# Patient Record
Sex: FEMALE | Race: AMERICAN INDIAN OR ALASKA NATIVE | Employment: OTHER | URBAN - METROPOLITAN AREA
[De-identification: names, ages, dates, MRNs, and addresses within clinical notes are randomized per-mention and may not be internally consistent; named-entity substitution may affect disease eponyms.]

---

## 2021-03-15 DIAGNOSIS — Z23 NEED FOR VACCINATION: ICD-10-CM

## 2021-10-19 ENCOUNTER — ORDER TRANSCRIPTION (OUTPATIENT)
Dept: PHYSICAL THERAPY | Facility: HOSPITAL | Age: 76
End: 2021-10-19

## 2021-10-19 DIAGNOSIS — M25.562 PAIN IN LEFT KNEE: Primary | ICD-10-CM

## 2021-11-08 ENCOUNTER — OFFICE VISIT (OUTPATIENT)
Dept: PHYSICAL THERAPY | Age: 76
End: 2021-11-08
Attending: ORTHOPAEDIC SURGERY
Payer: MEDICARE

## 2021-11-08 DIAGNOSIS — M25.562 PAIN IN LEFT KNEE: ICD-10-CM

## 2021-11-08 PROCEDURE — 97110 THERAPEUTIC EXERCISES: CPT

## 2021-11-08 PROCEDURE — 97161 PT EVAL LOW COMPLEX 20 MIN: CPT

## 2021-11-08 NOTE — PROGRESS NOTES
KNEE EVALUATION:   Referring Physician: Dr. Chelita Pemberton  Diagnosis: L knee pain   Date of Service: 11/8/2021     PATIENT SUMMARY   Alan Barnett is a 78year old female who presents to therapy today with complaints of L knee pain since the end of Sept. Sh POC and HEP. Pt voiced understanding and performs HEP correctly without reported pain. Skilled Physical Therapy is medically necessary to address the above impairments and reach functional goals.      Precautions:  None  OBJECTIVE:   Observation/Skin: L kn improve knee AROM flexion to >10 degrees to improve ability to perform transfers up and down from the floor easily  · Pt will improve quad strength to at least 4+/5 to ascend 1 flight of stairs reciprocally and smoothly  · Pt will improve SLS to >  12 s to

## 2021-11-12 ENCOUNTER — OFFICE VISIT (OUTPATIENT)
Dept: PHYSICAL THERAPY | Age: 76
End: 2021-11-12
Attending: ORTHOPAEDIC SURGERY
Payer: MEDICARE

## 2021-11-12 DIAGNOSIS — M25.562 PAIN IN LEFT KNEE: ICD-10-CM

## 2021-11-12 PROCEDURE — 97140 MANUAL THERAPY 1/> REGIONS: CPT

## 2021-11-12 PROCEDURE — 97110 THERAPEUTIC EXERCISES: CPT

## 2021-11-12 PROCEDURE — 97112 NEUROMUSCULAR REEDUCATION: CPT

## 2021-11-12 NOTE — PROGRESS NOTES
Dx:  L Knee OA and pain       Insurance (Authorized # of Visits):  6      Authorizing Physician: Dr. Dahiana Valverde  Next MD visit: none scheduled  Fall Risk: standard         Precautions:            Subjective: Has trouble with R foot muscles shaking when she trie

## 2021-11-15 ENCOUNTER — OFFICE VISIT (OUTPATIENT)
Dept: PHYSICAL THERAPY | Age: 76
End: 2021-11-15
Attending: ORTHOPAEDIC SURGERY
Payer: MEDICARE

## 2021-11-15 DIAGNOSIS — M25.562 PAIN IN LEFT KNEE: ICD-10-CM

## 2021-11-15 PROCEDURE — 97112 NEUROMUSCULAR REEDUCATION: CPT

## 2021-11-15 PROCEDURE — 97110 THERAPEUTIC EXERCISES: CPT

## 2021-11-15 PROCEDURE — 97140 MANUAL THERAPY 1/> REGIONS: CPT

## 2021-11-15 NOTE — PROGRESS NOTES
Dx:  L Knee OA and pain       Insurance (Authorized # of Visits):  6      Authorizing Physician: Dr. Lissa Mendes  Next MD visit: none scheduled  Fall Risk: standard         Precautions:            Subjective: Has trouble with R foot muscles shaking when she trie Patellar mobs, STM to ITB, Gr III tib-femoral mobs , R/L Patellar mobs, STM to ITB, Gr III tib-femoral mobs , R/L      Vestibular board: balance, rock A/P   - SLS R/L 15 sec x3 Vestibular board: balance, rock A/P      HEP: HEP for:  Quad sets, heel slide

## 2021-11-22 ENCOUNTER — OFFICE VISIT (OUTPATIENT)
Dept: PHYSICAL THERAPY | Age: 76
End: 2021-11-22
Attending: ORTHOPAEDIC SURGERY
Payer: MEDICARE

## 2021-11-22 DIAGNOSIS — M25.562 PAIN IN LEFT KNEE: ICD-10-CM

## 2021-11-22 PROCEDURE — 97116 GAIT TRAINING THERAPY: CPT

## 2021-11-22 PROCEDURE — 97110 THERAPEUTIC EXERCISES: CPT

## 2021-11-22 NOTE — PROGRESS NOTES
Dx:  L Knee OA and pain       Insurance (Authorized # of Visits):  6      Authorizing Physician: Dr. Sergei Cardoso  Next MD visit: none scheduled  Fall Risk: standard         Precautions:            Subjective:Wants to be able to walk better with improved weight b TKE  x10   - SLR x  QS x10  Tke x30   QS x15 R/L  SLR x20  SLS : 3 attempts R/L  SLS with hip abd, flex x10 each R/L with RTB      Reviewed all home exercise ASU 6\" step x15 R/L ASU 6\" step up and over x12 R/L       Shuttle: DLP L4  x20  SLP L4  R/L x1

## 2021-11-22 NOTE — PROGRESS NOTES
Dx:  L Knee OA and pain       Insurance (Authorized # of Visits):  6      Authorizing Physician: Dr. Bahena Shown  Next MD visit: none scheduled  Fall Risk: standard         Precautions:            Subjective:Wants to be able to walk better with improved weight b in PBs x10 R/L  Then heel toe gait on carpet     TKE  x10   - SLR x  QS x10  Tke x30   QS x15 R/L  SLR x20  SLS : 3 attempts R/L  SLS with hip abd, flex x10 each R/L with RTB      Reviewed all home exercise ASU 6\" step x15 R/L ASU 6\" step up and over x12

## 2021-11-23 ENCOUNTER — TELEPHONE (OUTPATIENT)
Dept: PHYSICAL THERAPY | Age: 76
End: 2021-11-23

## 2021-11-23 ENCOUNTER — TELEPHONE (OUTPATIENT)
Dept: PHYSICAL THERAPY | Facility: HOSPITAL | Age: 76
End: 2021-11-23

## 2021-11-29 ENCOUNTER — APPOINTMENT (OUTPATIENT)
Dept: PHYSICAL THERAPY | Age: 76
End: 2021-11-29
Attending: ORTHOPAEDIC SURGERY
Payer: MEDICARE

## 2021-11-30 ENCOUNTER — OFFICE VISIT (OUTPATIENT)
Dept: PHYSICAL THERAPY | Age: 76
End: 2021-11-30
Attending: INTERNAL MEDICINE
Payer: MEDICARE

## 2021-11-30 PROCEDURE — 97110 THERAPEUTIC EXERCISES: CPT

## 2021-11-30 PROCEDURE — 97140 MANUAL THERAPY 1/> REGIONS: CPT

## 2021-11-30 NOTE — PROGRESS NOTES
Dx:  L Knee OA and pain       Insurance (Authorized # of Visits):  8      Authorizing Physician: Dr. Cabello ref.  provider found  Next MD visit: none scheduled  Fall Risk: standard         Precautions:            Subjective:Wants to be able to walk better with R/L  Then heel toe gait on carpet practiced heel/toe gait in place in PBs x10 R/L    TKE  x10   - SLR x  QS x10  Tke x30   QS x15 R/L  SLR x20  SLS : 3 attempts R/L  SLS with hip abd, flex x10 each R/L with RTB  SLS : 3 attempts R/L  SLS with hip abd, flex

## 2021-12-03 ENCOUNTER — OFFICE VISIT (OUTPATIENT)
Dept: PHYSICAL THERAPY | Age: 76
End: 2021-12-03
Attending: ORTHOPAEDIC SURGERY
Payer: MEDICARE

## 2021-12-03 DIAGNOSIS — M25.562 PAIN IN LEFT KNEE: ICD-10-CM

## 2021-12-03 PROCEDURE — 97110 THERAPEUTIC EXERCISES: CPT

## 2021-12-03 PROCEDURE — 97140 MANUAL THERAPY 1/> REGIONS: CPT

## 2021-12-03 NOTE — PROGRESS NOTES
Dx:  L Knee OA and pain       Insurance (Authorized # of Visits):  6      Authorizing Physician: Dr. Melisa Ocononr  Next MD visit: none scheduled  Fall Risk: standard         Precautions:            Subjective:Wants to be able to walk better with improved weight b gait on carpet practiced heel/toe gait in place in PBs x10 R/L Manual gastroc stretches, supine posterior tibial mobs into extension, seated tib-femoral mobs Gr II   TKE  x10   - SLR x  QS x10  Tke x30   QS x15 R/L  SLR x20  SLS : 3 attempts R/L  SLS with

## 2021-12-06 ENCOUNTER — OFFICE VISIT (OUTPATIENT)
Dept: PHYSICAL THERAPY | Age: 76
End: 2021-12-06
Attending: ORTHOPAEDIC SURGERY
Payer: MEDICARE

## 2021-12-06 DIAGNOSIS — M25.562 PAIN IN LEFT KNEE: ICD-10-CM

## 2021-12-06 PROCEDURE — 97140 MANUAL THERAPY 1/> REGIONS: CPT

## 2021-12-06 PROCEDURE — 97110 THERAPEUTIC EXERCISES: CPT

## 2021-12-06 NOTE — PROGRESS NOTES
Dx:  L Knee OA and pain       Insurance (Authorized # of Visits):  6      Authorizing Physician: Dr. Rani Tidwell  Next MD visit: none scheduled  Fall Risk: standard         Precautions:            Subjective:. Felt some snapping at L lateral leg today.  Notes she warm up    Encouraged walking, nustep, reviewing posture for all activites.    Advised against holding weights during walking program.   practiced heel/toe gait in place in PBs x10 R/L  Then heel toe gait on carpet practiced heel/toe gait in place in PBs x1 min

## 2021-12-13 ENCOUNTER — OFFICE VISIT (OUTPATIENT)
Dept: PHYSICAL THERAPY | Age: 76
End: 2021-12-13
Attending: ORTHOPAEDIC SURGERY
Payer: MEDICARE

## 2021-12-13 DIAGNOSIS — M25.562 PAIN IN LEFT KNEE: ICD-10-CM

## 2021-12-13 PROCEDURE — 97110 THERAPEUTIC EXERCISES: CPT

## 2021-12-13 PROCEDURE — 97140 MANUAL THERAPY 1/> REGIONS: CPT

## 2021-12-13 NOTE — PROGRESS NOTES
Dx:  L Knee OA and pain       Insurance (Authorized # of Visits):  6      Authorizing Physician: Dr. Tamera Riedel  Next MD visit: none scheduled  Fall Risk: standard         Precautions:          Discharge Summary   Subjective:. . Notes she has no trouble standing TX#: 5/8 Date: 12/3/2021  Tx# 6/8 12/6/2021 7/8 12/13/2021 8/8   Nu-step 5 mins warm up Level 4  Nu-step 5 mins warm up Level 4  Nu-step 5 mins warm up Level 4  Nu-step 5 mins warm up Level 4  Nu- step 5 mins warm up  7 mins Nu-step    Encouraged Gr III tib-femoral mobs  Medial knee glides L  (some tenderness at L lateral side of patella) Hip add  with ball  3 sec x20    Vestibular board: balance, rock A/P TKE 2# x30 R/L (slight pain on R) TKE 2# x30 R/L (no pain on R/L) Walked 1/10 of mile on trac

## 2022-01-27 ENCOUNTER — APPOINTMENT (OUTPATIENT)
Dept: PHYSICAL THERAPY | Age: 77
End: 2022-01-27
Attending: ORTHOPAEDIC SURGERY
Payer: MEDICARE

## 2022-02-03 ENCOUNTER — APPOINTMENT (OUTPATIENT)
Dept: PHYSICAL THERAPY | Age: 77
End: 2022-02-03
Attending: ORTHOPAEDIC SURGERY
Payer: MEDICARE

## 2022-02-10 ENCOUNTER — APPOINTMENT (OUTPATIENT)
Dept: PHYSICAL THERAPY | Age: 77
End: 2022-02-10
Attending: ORTHOPAEDIC SURGERY
Payer: MEDICARE

## 2022-02-10 ENCOUNTER — TELEPHONE (OUTPATIENT)
Dept: PHYSICAL THERAPY | Age: 77
End: 2022-02-10

## 2022-02-17 ENCOUNTER — ORDER TRANSCRIPTION (OUTPATIENT)
Dept: PHYSICAL THERAPY | Facility: HOSPITAL | Age: 77
End: 2022-02-17

## 2022-02-17 ENCOUNTER — OFFICE VISIT (OUTPATIENT)
Dept: PHYSICAL THERAPY | Age: 77
End: 2022-02-17
Attending: ORTHOPAEDIC SURGERY
Payer: MEDICARE

## 2022-02-17 PROCEDURE — 97140 MANUAL THERAPY 1/> REGIONS: CPT

## 2022-02-17 PROCEDURE — 97110 THERAPEUTIC EXERCISES: CPT

## 2022-02-17 PROCEDURE — 97162 PT EVAL MOD COMPLEX 30 MIN: CPT

## 2022-02-17 NOTE — PROGRESS NOTES
SPINE EVALUATION:   Referring Physician: Dr. Kelby Peguero  Diagnosis: Cervical pain   , Knee OA and gait disturbance. Date of Service: 2/17/2022     PATIENT SUMMARY   Ashlee Smith is a 78year old female who presents to therapy today with complaints of neck pain when she is walking. Doesn't have it everyday. Uses icy/heat on the back of her neck for relief. Rufina Horton a history of neck pain pain for several year that would come and go with flare up in the past 6 months. . Notes she has been under a lot of stress lately and knows that it is related. No recent imaging. Stopped working out with a  as she is having trouble with overall mobility. She is afraid of walking, falling and afraid of driving. Was able to drive once with her son in law. Lives alone. She denies knee pain but knows she has L knee OA. Patient had been working out with a  but has temporarily stopped due to knee pain ,gait difficulties and need for therapy. Pt describes pain level current 2 /10, at best  0/10, at worst 6-7 /10. Current functional limitations include pain looking up or down, getting up after being in lying down position. Perla describes prior level of function denies pain a year ago. Pt goals include decrease neck pain, improve walking. Past medical history was reviewed with Perla. Significant findings include  Anxiety , cortisone shot L knee . Patient completed a series of PT for knee pain and gait disturbance in Nov, Dec 2021. Pt denies diplopia, dysarthria, dysphasia, dizziness, drop attacks, bowel/bladder changes, saddle anesthesia, and DEVORA LE N/T.    ASSESSMENT  Perla presents to physical therapy evaluation with primary c/o neck pain. .Secondary complaint is gait disturbance with fear of falling and history of bilateral knee arthritic pain. Anxiety appears to be playing a role in her gait and fear of falling.  The results of the objective tests and measures show restriction in CT joint mobility, postural deviations, decreased knee extension and L hip extension bilaterally and L knee valgus. Functional deficits include but are not limited to looking up and down., walking on levels and stairs. .  Signs and symptoms are consistent with diagnosis of cervical pain without radiculopathy, likely DDD. Pt and PT discussed evaluation findings, pathology, POC and HEP. Pt voiced understanding and performs HEP correctly without reported pain. Skilled Physical Therapy is medically necessary to address the above impairments and reach functional goals. Precautions:  None  OBJECTIVE:   Observation/Posture: fwd head . R shoulder and hip higher than L.   L genu valgus. Neuro Screen: Intact sensation and reflexes at UEs     Cervical  AROM: (* denotes performed with pain)  Flexion: WNL, slight pain  Extension: + pain , 25% wnl  Sidebending: R =wnl; L =wnl w/ pain l. Rotation: R 55 deg ; L=60 deg   Pain L     Bilateral knee extension lacks 10 degrees from neutral. L hip lacks 5 deg extension    Accessory motion: impaired at CT junction  Palpation: tenderness at C7-T1, T2 spinous processes , upper traps     Strength: UE=wnl  LE:   Hips=4+/5, knees=4/5 ankles=4/5  Flexibility:   UE/Scapular   Upper Trap: R/L min restricted  Levator Scap: R/L=min restricted  Pec Major: R /L=min restricted     LE flexibility     Hamstrings: unable to fully extend R or L leg. Gastroc-soleus: R/L= mod restricted  Piriformis: R =wnl; L mod restricted with joint limitations  Quads: R = min restricted ; L mod restricted  * Decreased L hip ext (mod decreased psoas mobility     Special tests:   - Alar ligament testing,  - Spurling,   - AA and AO testing. Gait: pt ambulates on level ground without device, stiff legged gait, decreased weight shift L., decreased heel strike R/L, hips and knees held in flexion. Holds onto rail in hallway  Balance: SLS R/L=13 secs   Sit to stand: able to complete without UE support.   [employer] Treatment and Response:   Pt education was provided on exam findings, treatment diagnosis, treatment plan, expectations, and prognosis. Pt was also provided recommendations for use of heat, frequent position changes. Encouraged ongoing counseling, mental health support during this difficult period of her life. Patient was instructed in and issued a HEP for: continued balance exercises  ,  Adding C AROM rot R/L  x5 each . Reviewed HEP and practiced SLS , partial squats, heel raises    M; initiated manual CTX, supine PROM R/L rotation, STM to upper traps and levators,  CPA to CT spine, grade II CT junction mobs. Charges: PT Eval Moderate Complexity, M ex     Total Timed Treatment:  15* min     Total Treatment Time: 45 min     Based on clinical rationale and outcome measures, this evaluation involved Moderate Complexity decision making due to 3+ personal factors/comorbidities, 3 body structures involved/activity limitations, and evolving symptoms including changing pain levels. PLAN OF CARE:    Goals: (to be met in 8 visits)   *Improve cervical AROM to allow patient to look up and down w/out pain  * Improve C rot to allow patient to rotate 60 deg R/L w/out pain to allow her to look behind her without difficulty  * Improve confidence in gait to allow patient to complete community ambulation without holding onto wall or rail   * Improve bilateral knee extension to within 5 deg of full extension to allow improved heel strike in gait. * Improve SLS to 20 secs R/L to improve stability in gait. * Patient is IND to return to exercises program at gym with her . Frequency / Duration: Patient will be seen for 2 x/week or a total of 8 visits over a 90 day period.  Treatment will include: Manual Therapy, Neuromuscular Re-education, Self-Care Home Management, Therapeutic Exercise and Home Exercise Program instruction    Education or treatment limitation: None  Rehab Potential:good        Patient/Family/Caregiver was advised of these findings, precautions, and treatment options and has agreed to actively participate in planning and for this course of care. Thank you for your referral. Please co-sign or sign and return this letter via fax as soon as possible to [phone]. If you have any questions, please contact me at Dept: [phone]    Sincerely,  Electronically signed by therapist: Marco Leos, PT    [id number] certification required: Yes  I certify the need for these services furnished under this plan of treatment and while under my care.     X___________________________________________________ Date____________________    Certification From: 0/65/9175  To:5/18/2022

## 2022-02-24 ENCOUNTER — OFFICE VISIT (OUTPATIENT)
Dept: PHYSICAL THERAPY | Age: 77
End: 2022-02-24
Attending: ORTHOPAEDIC SURGERY
Payer: MEDICARE

## 2022-02-24 PROCEDURE — 97140 MANUAL THERAPY 1/> REGIONS: CPT

## 2022-02-24 PROCEDURE — 97116 GAIT TRAINING THERAPY: CPT

## 2022-02-24 PROCEDURE — 97110 THERAPEUTIC EXERCISES: CPT

## 2022-02-24 NOTE — PROGRESS NOTES
Dx:  Bilateral knee OA, cervical strain. Insurance (Authorized # of Visits):  8         Authorizing Physician: Dr. Ana Brennan ref. provider found  Next MD visit: none scheduled  Fall Risk: standard         Precautions: n/a             Subjective: Neck has been much better since last appt. Feels comfort from wearing a scarf. Pain: 0*/10      Objective:     Gait: walked to clinic with L knee flexion, holding knee stiff-legged, small step length, holding on to [place] rail for UE support. No L knee pain with patellar femoral mobs or end range KE. Assessment: Neck was asymptomatic today. Pt continues to amb with short stride, decreased L knee flexion and expresses fear of falling. Continues to have high anxiety overall. Goals: (to be met in 8 visits)   *Improve cervical AROM to allow patient to look up and down w/out pain  * Improve C rot to allow patient to rotate 60 deg R/L w/out pain to allow her to look behind her without difficulty  * Improve confidence in gait to allow patient to complete community ambulation without holding onto wall or rail   * Improve bilateral knee extension to within 5 deg of full extension to allow improved heel strike in gait. * Improve SLS to 20 secs R/L to improve stability in gait. * Patient is IND to return to exercises program at gym with her . Plan: Cont w/ gait and balance training, L knee rehab.  cervical rehab prn. Date: 2/24/2022  TX#: 2/6 Date:                 TX#: 3/ Date:                 TX#: 4/ Date:                 TX#: 5/ Date: Tx#: 6/   Nu- step  St 10  5 mins w/u       Standing L knee flexion ROM on step  R/L x15 ea       PBs: side step R/L x12   - walk in PBs 12' x4  Step fwd/back with L LE in weight bearing       Shuttle : DLP L4 x20  DHR L3 x15        TKE x20        Manual C TX , AAROM C rot R/L : 5 min total       Practiced gait without UE support with cues for stride length , L knee flexion at swing through and step length. Practiced gait in PBs and side step R/L 10' x4         Tib-femoral mobs Gr II-III ,  PF mobs in supine and on shuttle, Manual OP in to full knee ext  8 mins        HEP:  Quad sets, heel slides, gastroc stretches, prone knee bends x15 R/L      Charges:m, ex, gt       Total Timed Treatment: 45 min  Total Treatment Time: 45 min

## 2022-03-03 ENCOUNTER — OFFICE VISIT (OUTPATIENT)
Dept: PHYSICAL THERAPY | Age: 77
End: 2022-03-03
Attending: ORTHOPAEDIC SURGERY
Payer: MEDICARE

## 2022-03-03 PROCEDURE — 97110 THERAPEUTIC EXERCISES: CPT

## 2022-03-03 PROCEDURE — 97140 MANUAL THERAPY 1/> REGIONS: CPT

## 2022-03-03 PROCEDURE — 97112 NEUROMUSCULAR REEDUCATION: CPT

## 2022-03-03 NOTE — PROGRESS NOTES
Dx:  Bilateral knee OA, cervical strain. Insurance (Authorized # of Visits):  8         Authorizing Physician: Dr. Blanca Alberto ref. provider found  Next MD visit: none scheduled  Fall Risk: standard         Precautions: n/a             Subjective: Neck has been much better since last appt. Neck feels good. Doesn't comfortable walking outside. Was able to drive once this week. L kneecap feels like it catches at times. Pain: 0*/10      Objective:     Gait: walked to clinic with L knee flexion, holding knee stiff-legged, small step length, holding on to [place] rail for UE support. No L knee pain with patellar femoral mobs or end range KE. Assessment:. Pt continues to amb with short stride, decreased L knee flexion and expresses fear of falling but demonstrates good balance on the vestibular board and is able to respond to cues for KNF at swing through. Goals: (to be met in 8 visits)   *Improve cervical AROM to allow patient to look up and down w/out pain MET   * Improve C rot to allow patient to rotate 60 deg R/L w/out pain to allow her to look behind her without difficulty    * Improve confidence in gait to allow patient to complete community ambulation without holding onto wall or rail   * Improve bilateral knee extension to within 5 deg of full extension to allow improved heel strike in gait. * Improve SLS to 20 secs R/L to improve stability in gait. * Patient is IND to return to exercises program at gym with her . Plan: Cont w/ gait and balance training, L knee rehab.  cervical rehab prn.  3/8, 3/17 3/24. Check SLS   Date: 2/24/2022  TX#: 2/6 Date:  3/3/2022               TX#: 3/6 Date:                 TX#: 4/ Date:                 TX#: 5/ Date:    Tx#: 6/   Nu- step  St 10  5 mins w/u  Nu-step: 5 mins       Standing L knee flexion ROM on step  R/L x15 ea Balance board : A/P and balance      PBs: side step R/L x12   - walk in PBs 12' x4  Step fwd/back with L LE in weight bearing Standing dynamic stretches R/L on 8\" step   ( hamstrings, hip flexors, hip IR/ER)       Shuttle : DLP L4 x20  DHR L3 x15  Shuttle : DLP L5 x20  SLP R/L  X X20   DHR L3 x15 , alt calf stretches       TKE x20  TKE x10 , then x25 with 1# wt       Manual C TX , AAROM C rot R/L : 5 min total Manual medial L patellar glide, stretch to lateral retinaculum. Practiced gait without UE support with cues for stride length , L knee flexion at swing through and step length.    Practiced gait in PBs and side step R/L 10' x4 3 way kicks with YTB x10 R/L with cues for posture  - lateral walks with band R/L         Tib-femoral mobs Gr II-III ,  PF mobs in supine and on shuttle, Manual OP in to full knee ext  8 mins  Tib-femoral mobs Gr II-III ,  PF mobs in supine and on shuttle,  - PF mobs with AROM R/L KNF x10      HEP:  Quad sets, heel slides, gastroc stretches, prone knee bends x15 R/L  3/3/2022:  ASU x30 , SLS R/L       Charges:m, ex, neuroreed      Total Timed Treatment: 45 min  Total Treatment Time: 45 min

## 2022-03-08 ENCOUNTER — OFFICE VISIT (OUTPATIENT)
Dept: PHYSICAL THERAPY | Age: 77
End: 2022-03-08
Attending: ORTHOPAEDIC SURGERY
Payer: MEDICARE

## 2022-03-08 PROCEDURE — 97140 MANUAL THERAPY 1/> REGIONS: CPT

## 2022-03-08 PROCEDURE — 97110 THERAPEUTIC EXERCISES: CPT

## 2022-03-08 PROCEDURE — 97112 NEUROMUSCULAR REEDUCATION: CPT

## 2022-03-08 NOTE — PROGRESS NOTES
Dx:  Bilateral knee OA, cervical strain. Insurance (Authorized # of Visits):  8         Authorizing Physician: Dr. Terrance Burkitt ref. provider found  Next MD visit: none scheduled  Fall Risk: standard         Precautions: n/a             Subjective:  Knees feel stiff today. . Doesn't comfortable walking outside. . L kneecap feels like it catches at times. Pain: 0*/10      Objective:     Gait: walked to clinic with L knee flexion, holding knee stiff-legged, small step length, holding on to [place] rail for UE support. No L knee pain with patellar femoral mobs or end range KE. Assessment:. Good tolerance of walking 1/10 of mile. Pt continues to amb with short stride, decreased L knee flexion and expresses fear of falling but demonstrates good balance on the vestibular board and is able to respond to cues for KNF at swing through. Goals: (to be met in 8 visits)   *Improve cervical AROM to allow patient to look up and down w/out pain MET   * Improve C rot to allow patient to rotate 60 deg R/L w/out pain to allow her to look behind her without difficulty    * Improve confidence in gait to allow patient to complete community ambulation without holding onto wall or rail   * Improve bilateral knee extension to within 5 deg of full extension to allow improved heel strike in gait. * Improve SLS to 20 secs R/L to improve stability in gait. * Patient is IND to return to exercises program at gym with her . Plan: Cont w/ gait and balance training, L knee rehab.  cervical rehab prn.   3/17 3/24. Check SLS . Progress to walking outside. Date: 2/24/2022  TX#: 2/6 Date:  3/3/2022               TX#: 3/6 Date:    3/8/2022             TX#: 4/6 Date:                 TX#: 5/ Date:    Tx#: 6/   Nu- step  St 10  5 mins w/u  Nu-step: 5 mins   Nu-step 8 ins      Standing L knee flexion ROM on step  R/L x15 ea Balance board : A/P and balance Balance board : A/P and balance     PBs: side step R/L x12   - walk in PBs 12' x4  Step fwd/back with L LE in weight bearing Standing dynamic stretches R/L on 8\" step   ( hamstrings, hip flexors, hip IR/ER)  Tap ups R/L on 6\" step x10 each, step ups 6\" x10 each      Shuttle : DLP L4 x20  DHR L3 x15  Shuttle : DLP L5 x20  SLP R/L  X X20   DHR L3 x15 , alt calf stretches  Shuttle : DLP L5 x20  SLP R/L  X X20   DHR L3 x15 , alt calf stretches      TKE x20  TKE x10 , then x25 with 1# wt  TKE L x20 with medial patellar glide   - tib femoral mobs Gr III R/L; medial patellar glides and stretch to lateral retinaculum      Manual C TX , AAROM C rot R/L : 5 min total Manual medial L patellar glide, stretch to lateral retinaculum. Walk 1/10 of mile with cues for KNF at  swing through, needed 1 rest      Practiced gait without UE support with cues for stride length , L knee flexion at swing through and step length.    Practiced gait in PBs and side step R/L 10' x4 3 way kicks with YTB x10 R/L with cues for posture  - lateral walks with band R/L         Tib-femoral mobs Gr II-III ,  PF mobs in supine and on shuttle, Manual OP in to full knee ext  8 mins  Tib-femoral mobs Gr II-III ,  PF mobs in supine and on shuttle,  - PF mobs with AROM R/L KNF x10      HEP:  Quad sets, heel slides, gastroc stretches, prone knee bends x15 R/L  3/3/2022:  ASU x30 , SLS R/L       Charges:m, ex, neuroreed      Total Timed Treatment: 40 min  Total Treatment Time: 40 min

## 2022-03-17 ENCOUNTER — OFFICE VISIT (OUTPATIENT)
Dept: PHYSICAL THERAPY | Age: 77
End: 2022-03-17
Attending: ORTHOPAEDIC SURGERY
Payer: MEDICARE

## 2022-03-17 PROCEDURE — 97110 THERAPEUTIC EXERCISES: CPT

## 2022-03-17 PROCEDURE — 97112 NEUROMUSCULAR REEDUCATION: CPT

## 2022-03-17 NOTE — PROGRESS NOTES
Dx:  Bilateral knee OA, cervical strain. Insurance (Authorized # of Visits):  8         Authorizing Physician: Dr. Zan Marcos ref. provider found  Next MD visit: none scheduled  Fall Risk: standard         Precautions: n/a             Subjective:  Knees feel stiff today. . Did walk outside yesterday. . L knee was sore after last session but feels better today. Pain: 0*/10      Objective:     Gait: walked to clinic with L knee flexion, holding knee stiff-legged, small step length, decreased stride length and hip extension    No L knee pain with patellar femoral mobs or end range KE. Assessment:. Good tolerance of walking 1/10 of mile. Patient is still afraid of walking on floors that are too smooth. Continued to encourage walking and outings. Encouraged patient to schedule restart of personal training in April. Pt continues to amb with short stride, decreased L knee flexion and expresses fear of falling but demonstrates good balance on the vestibular board and is able to respond to cues for KNF at swing through. Goals: (to be met in 8 visits)   *Improve cervical AROM to allow patient to look up and down w/out pain MET   * Improve C rot to allow patient to rotate 60 deg R/L w/out pain to allow her to look behind her without difficulty    * Improve confidence in gait to allow patient to complete community ambulation without holding onto wall or rail   * Improve bilateral knee extension to within 5 deg of full extension to allow improved heel strike in gait. * Improve SLS to 20 secs R/L to improve stability in gait. * Patient is IND to return to exercises program at gym with her . Plan: Cont w/ gait and balance training, L knee rehab.  cervical rehab prn.    3/24. Check SLS . Progress to walking outside. Prepare for discharge next visit.     Date: 2/24/2022  TX#: 2/6 Date:  3/3/2022               TX#: 3/6 Date:    3/8/2022             TX#: 4/6 Date: 3/17/2022                TX#: 5/6 Date:   Tx#: 6/   Nu- step  St 10  5 mins w/u  Nu-step: 5 mins   Nu-step 8 ins  Nu- step 5 mins     Standing L knee flexion ROM on step  R/L x15 ea Balance board : A/P and balance Balance board : A/P and balance Balance board : A/P, M/L and balance    PBs: side step R/L x12   - walk in PBs 12' x4  Step fwd/back with L LE in weight bearing Standing dynamic stretches R/L on 8\" step   ( hamstrings, hip flexors, hip IR/ER)  Tap ups R/L on 6\" step x10 each, step ups 6\" x10 each  Standing hip abd x10 R/L  - hip circles x8 R/L  - standing hip flexor stretches 30 sec x3 R/L  - heel raises x15    Shuttle : DLP L4 x20  DHR L3 x15  Shuttle : DLP L5 x20  SLP R/L  X X20   DHR L3 x15 , alt calf stretches  Shuttle : DLP L5 x20  SLP R/L  X X20   DHR L3 x15 , alt calf stretches  DLP L 4 x20   SLP L4 x20 , L5 x10   L3 DHR  X12, alt heel stretches R/L     TKE x20  TKE x10 , then x25 with 1# wt  TKE L x20 with medial patellar glide   - tib femoral mobs Gr III R/L; medial patellar glides and stretch to lateral retinaculum  Recumbent bike : 5 mins L 2    Manual C TX , AAROM C rot R/L : 5 min total Manual medial L patellar glide, stretch to lateral retinaculum. Walk 1/10 of mile with cues for KNF at  swing through, needed 1 rest  Walk 1/10 of mile with cues for KNF at  swing through no rest.     Practiced gait without UE support with cues for stride length , L knee flexion at swing through and step length. Practiced gait in PBs and side step R/L 10' x4 3 way kicks with YTB x10 R/L with cues for posture  - lateral walks with band R/L   Seated hip add x20       Tib-femoral mobs Gr II-III ,  PF mobs in supine and on shuttle, Manual OP in to full knee ext  8 mins  Tib-femoral mobs Gr II-III ,  PF mobs in supine and on shuttle,  - PF mobs with AROM R/L KNF x10  Assessment of patient's gym shoes for safety.      HEP:  Quad sets, heel slides, gastroc stretches, prone knee bends x15 R/L  3/3/2022:  ASU x30 , SLS R/L       Charges ex, neuroreed 2    Total Timed Treatment: 45 min  Total Treatment Time: 45 min

## 2022-03-24 ENCOUNTER — TELEPHONE (OUTPATIENT)
Dept: PHYSICAL THERAPY | Facility: HOSPITAL | Age: 77
End: 2022-03-24

## 2022-03-24 ENCOUNTER — APPOINTMENT (OUTPATIENT)
Dept: PHYSICAL THERAPY | Age: 77
End: 2022-03-24
Attending: ORTHOPAEDIC SURGERY
Payer: MEDICARE

## 2022-03-28 ENCOUNTER — OFFICE VISIT (OUTPATIENT)
Dept: PHYSICAL THERAPY | Age: 77
End: 2022-03-28
Attending: INTERNAL MEDICINE
Payer: MEDICARE

## 2022-03-28 PROCEDURE — 97116 GAIT TRAINING THERAPY: CPT

## 2022-03-28 PROCEDURE — 97110 THERAPEUTIC EXERCISES: CPT

## 2022-03-28 PROCEDURE — 97140 MANUAL THERAPY 1/> REGIONS: CPT

## 2022-03-28 NOTE — PROGRESS NOTES
Dx:  Bilateral knee OA, cervical strain. Insurance (Authorized # of Visits):  8         Authorizing Physician: Dr. Teresa Benton ref. provider found  Next MD visit: none scheduled  Fall Risk: standard         Precautions: n/a           Discharge Summary   Subjective:  Feels she is walking better since starting therapy. Tries not to hold onto walls or furniture when she walks. Feels ready to return to personal training. Pain: 0*/10  Just L knee stiffness       Objective:     Gait: walked to clinic with decreased trunk rotation, decreased  L knee flexion, stiff-legged, small step length, decreased stride length and hip extension    No L knee pain with patellar femoral mobs or end range KE. Assessment:. Good tolerance of walking 1/10 of mile. Patient is still afraid of walking on floors that are too smooth. Continued to encourage walking and outings. Encouraged patient to schedule restart of personal training in April. Pt continues to amb with short stride, decreased L knee flexion and expresses fear of falling but demonstrates good balance on the vestibular board and is able to respond to cues for KNF at swing through. Goals: (to be met in 8 visits)   *Improve cervical AROM to allow patient to look up and down w/out pain MET   * Improve C rot to allow patient to rotate 60 deg R/L w/out pain to allow her to look behind her without difficulty   MET   * Improve confidence in gait to allow patient to complete community ambulation without holding onto wall or rail   Improved   * Improve bilateral knee extension to within 5 deg of full extension to allow improved heel strike in gait. * Improve SLS to 20 secs R/L to improve stability in gait. * Patient is IND to return to exercises program at gym with her . MET         Plan:       Check SLS .    Date: 2/24/2022  TX#: 2/6 Date:  3/3/2022               TX#: 3/6 Date:    3/8/2022             TX#: 4/6 Date: 3/17/2022                TX#: 5/6 Date: 3/28/2022  Tx#: 6/6   Nu- step  St 10  5 mins w/u  Nu-step: 5 mins   Nu-step 8 ins  Nu- step 5 mins  Nu- step 5 mins    Standing L knee flexion ROM on step  R/L x15 ea Balance board : A/P and balance Balance board : A/P and balance Balance board : A/P, M/L and balance Balance board : A/P, M/L and balance   PBs: side step R/L x12   - walk in PBs 12' x4  Step fwd/back with L LE in weight bearing Standing dynamic stretches R/L on 8\" step   ( hamstrings, hip flexors, hip IR/ER)  Tap ups R/L on 6\" step x10 each, step ups 6\" x10 each  Standing hip abd x10 R/L  - hip circles x8 R/L  - standing hip flexor stretches 30 sec x3 R/L  - heel raises x15 Walk 1/10 of mile, no rests. Practiced gait without UE support with cues for stride length , L knee flexion at swing through and step length. Shuttle : DLP L4 x20  DHR L3 x15  Shuttle : DLP L5 x20  SLP R/L  X X20   DHR L3 x15 , alt calf stretches  Shuttle : DLP L5 x20  SLP R/L  X X20   DHR L3 x15 , alt calf stretches  DLP L 4 x20   SLP L4 x20 , L5 x10   L3 DHR  X12, alt heel stretches R/L  DLP L 4 x20   SLP L4 x20 , L5 x10   L3 DHR  X12, alt heel stretches R/L    TKE x20  TKE x10 , then x25 with 1# wt  TKE L x20 with medial patellar glide   - tib femoral mobs Gr III R/L; medial patellar glides and stretch to lateral retinaculum  Recumbent bike : 5 mins L 2 Seated knee ext x20 L   [id number]   Manual C TX , AAROM C rot R/L : 5 min total Manual medial L patellar glide, stretch to lateral retinaculum. Walk 1/10 of mile with cues for KNF at  swing through, needed 1 rest  Walk 1/10 of mile with cues for KNF at  swing through no rest.  Manual medial L patellar glide, stretch to lateral retinaculum. Practiced gait without UE support with cues for stride length , L knee flexion at swing through and step length.    Practiced gait in PBs and side step R/L 10' x4 3 way kicks with YTB x10 R/L with cues for posture  - lateral walks with band R/L   Seated hip add x20  - tib femoral mobs Gr III R/L; medial patellar glides and stretch to lateral retinaculum     Tib-femoral mobs Gr II-III ,  PF mobs in supine and on shuttle, Manual OP in to full knee ext  8 mins  Tib-femoral mobs Gr II-III ,  PF mobs in supine and on shuttle,  - PF mobs with AROM R/L KNF x10  Assessment of patient's gym shoes for safety.   Side step R/L without UE support, braiding R/L without UE support    HEP:  Quad sets, heel slides, gastroc stretches, prone knee bends x15 R/L  3/3/2022:  ASU x30 , SLS R/L       Charges ex, m, gt   Total Timed Treatment: 45 min  Total Treatment Time: 45 min

## 2023-03-17 ENCOUNTER — ORDER TRANSCRIPTION (OUTPATIENT)
Dept: PHYSICAL THERAPY | Facility: HOSPITAL | Age: 78
End: 2023-03-17

## 2023-03-17 DIAGNOSIS — R26.9 ABNORMALITY OF GAIT AND MOBILITY: Primary | ICD-10-CM

## 2023-04-04 ENCOUNTER — OFFICE VISIT (OUTPATIENT)
Dept: PHYSICAL THERAPY | Age: 78
End: 2023-04-04
Attending: ORTHOPAEDIC SURGERY
Payer: MEDICARE

## 2023-04-04 DIAGNOSIS — R26.9 ABNORMALITY OF GAIT AND MOBILITY: ICD-10-CM

## 2023-04-04 PROCEDURE — 97116 GAIT TRAINING THERAPY: CPT

## 2023-04-04 PROCEDURE — 97110 THERAPEUTIC EXERCISES: CPT

## 2023-04-04 PROCEDURE — 97161 PT EVAL LOW COMPLEX 20 MIN: CPT

## 2023-04-07 ENCOUNTER — OFFICE VISIT (OUTPATIENT)
Dept: PHYSICAL THERAPY | Age: 78
End: 2023-04-07
Attending: ORTHOPAEDIC SURGERY
Payer: MEDICARE

## 2023-04-07 PROCEDURE — 97112 NEUROMUSCULAR REEDUCATION: CPT

## 2023-04-07 PROCEDURE — 97140 MANUAL THERAPY 1/> REGIONS: CPT

## 2023-04-07 PROCEDURE — 97110 THERAPEUTIC EXERCISES: CPT

## 2023-04-07 PROCEDURE — 97116 GAIT TRAINING THERAPY: CPT

## 2023-04-10 ENCOUNTER — OFFICE VISIT (OUTPATIENT)
Dept: PHYSICAL THERAPY | Age: 78
End: 2023-04-10
Attending: ORTHOPAEDIC SURGERY
Payer: MEDICARE

## 2023-04-10 PROCEDURE — 97110 THERAPEUTIC EXERCISES: CPT

## 2023-04-10 PROCEDURE — 97112 NEUROMUSCULAR REEDUCATION: CPT

## 2023-04-17 ENCOUNTER — OFFICE VISIT (OUTPATIENT)
Dept: PHYSICAL THERAPY | Age: 78
End: 2023-04-17
Attending: ORTHOPAEDIC SURGERY
Payer: MEDICARE

## 2023-04-17 PROCEDURE — 97112 NEUROMUSCULAR REEDUCATION: CPT

## 2023-04-17 PROCEDURE — 97110 THERAPEUTIC EXERCISES: CPT

## 2023-04-20 ENCOUNTER — OFFICE VISIT (OUTPATIENT)
Dept: PHYSICAL THERAPY | Age: 78
End: 2023-04-20
Attending: ORTHOPAEDIC SURGERY
Payer: MEDICARE

## 2023-04-20 PROCEDURE — 97116 GAIT TRAINING THERAPY: CPT

## 2023-04-20 PROCEDURE — 97110 THERAPEUTIC EXERCISES: CPT

## 2023-04-24 ENCOUNTER — OFFICE VISIT (OUTPATIENT)
Dept: PHYSICAL THERAPY | Age: 78
End: 2023-04-24
Attending: ORTHOPAEDIC SURGERY
Payer: MEDICARE

## 2023-04-24 PROCEDURE — 97110 THERAPEUTIC EXERCISES: CPT

## 2023-04-24 PROCEDURE — 97140 MANUAL THERAPY 1/> REGIONS: CPT

## 2023-04-27 ENCOUNTER — TELEPHONE (OUTPATIENT)
Dept: PHYSICAL THERAPY | Facility: HOSPITAL | Age: 78
End: 2023-04-27

## 2023-04-27 ENCOUNTER — APPOINTMENT (OUTPATIENT)
Dept: PHYSICAL THERAPY | Age: 78
End: 2023-04-27
Attending: ORTHOPAEDIC SURGERY
Payer: MEDICARE

## 2023-05-01 ENCOUNTER — OFFICE VISIT (OUTPATIENT)
Dept: PHYSICAL THERAPY | Age: 78
End: 2023-05-01
Attending: ORTHOPAEDIC SURGERY
Payer: MEDICARE

## 2023-05-01 PROCEDURE — 97110 THERAPEUTIC EXERCISES: CPT

## 2023-06-09 ENCOUNTER — OFFICE VISIT (OUTPATIENT)
Dept: PHYSICAL THERAPY | Age: 78
End: 2023-06-09
Attending: INTERNAL MEDICINE
Payer: MEDICARE

## 2023-06-09 PROCEDURE — 97110 THERAPEUTIC EXERCISES: CPT

## 2023-06-23 ENCOUNTER — APPOINTMENT (OUTPATIENT)
Dept: PHYSICAL THERAPY | Age: 78
End: 2023-06-23
Attending: ORTHOPAEDIC SURGERY
Payer: MEDICARE

## 2024-02-14 ENCOUNTER — ORDER TRANSCRIPTION (OUTPATIENT)
Dept: PHYSICAL THERAPY | Facility: HOSPITAL | Age: 79
End: 2024-02-14

## 2024-02-14 DIAGNOSIS — M13.862 OTHER SPECIFIED ARTHRITIS, LEFT KNEE: Primary | ICD-10-CM

## 2024-03-21 ENCOUNTER — OFFICE VISIT (OUTPATIENT)
Dept: PHYSICAL THERAPY | Age: 79
End: 2024-03-21
Attending: ORTHOPAEDIC SURGERY
Payer: MEDICARE

## 2024-03-21 DIAGNOSIS — M13.862 OTHER SPECIFIED ARTHRITIS, LEFT KNEE: Primary | ICD-10-CM

## 2024-03-21 PROCEDURE — 97140 MANUAL THERAPY 1/> REGIONS: CPT

## 2024-03-21 PROCEDURE — 97162 PT EVAL MOD COMPLEX 30 MIN: CPT

## 2024-03-21 PROCEDURE — 97110 THERAPEUTIC EXERCISES: CPT

## 2024-03-21 NOTE — PROGRESS NOTES
KNEE EVALUATION:   Referring Physician: Dr. Myles  Diagnosis: L knee pain , gait disturbance  R neck pain  Date of Service: 3/21/2024    Precautions: NONE      PATIENT SUMMARY   Perla Bernard is a 76 year old female who presents to therapy today with complaints  chronic  L knee pain . She has PT for her L knee 2 years ago and PT 6/2023 for gait and balance training.  She has had pa.  Had a L knee meniscus tear 13 years ago when she sustained a fall. .   Worked out consistently after that.  Works out with  Milla at , who noted she has had decreased WB on R knee for a while. . Wears a L knee sleeve. Reports she has had difficulty walking on uneven surfaces.  X rays + for L knee OA. Has had  cortisone shots in the past.  Lives alone in a 2 story home.   Current functional limitations include : problem with walking, problem with standing more than 10 mins . Also notes R sided  neck pain that has been chronic but flared up Saturday after doing weights Friday. f. Doing TRX and BOSU 1-2 days week. With    Not using a device for gait but is fearful of walking outside at times. Using pain relieving cream for top of R toes.    Goals : improve walking outside and on uneven surfaces. , ease neck pain   No falls in past year. Able to get up and down from the floor.   Pain : 0 in L knee or R knee   Neck : 3/1)       Perla describes prior level of function : notes she had an altered gait pattern for several years due to knee problems but is IND with all ADLs.  Past medical history was reviewed with Perla. Significant findings include L knee OA, hypothyroid, neck pain                          ASSESSMENT  Perla presents to physical therapy evaluation with primary c/o acute on chronic  R sided neck pain  as well as chronic bilateral knee pain that comes and goes .  . The results of the objective tests and measures show decreased R knee extension and decreased knee flexion at  swing through bilaterally leading  to  altered gait pattern She is also limited in L hip extension and internal rotation. She has TTP at R levator scapulae and R sided neck pain when turning L or side bending left consistent with muscular strain and faulty osture.  .  Functional deficits include but are not limited to walking, especially on uneven surfaces, standing  as well as turning head to Left.     Pt and PT discussed evaluation findings, pathology, POC and HEP.  Pt voiced understanding and performs HEP correctly without reported pain. Skilled Physical Therapy is medically necessary to address the above impairments and reach functional goals.      Precautions:  None  OBJECTIVE:   Observation/Skin: L knee valgus   Palpation: tenderness lateral to L patella     Sensation: intact     AAROM: (* denotes performed with pain)   Knee    Flexion: R 123 deg; L 127 deg   Extension: R 9 deg ; L =0      R hip ROM= WNL    L= WNL except hip IR=0, L hip ext=5 with LBP  Ankle ROM is wnl     Cervical Spine   Flex: wnl with R sided pain   Ext=75% WNL  Rot R= WNL  Rot L= min restricted with pain R  Lat flexion R= WNL   L= WNL with pain R     Patellar Mobility/Accessory motion: mod impaired L      Flexibility:   Hamstrings: unable to fully extend R or L leg.   Gastroc-soleus: R/L= mod restricted  Piriformis: R =wnl; L mod restricted with joint limitations  Quads: R = min restricted ; L mod restricted  * Decreased L hip ext (mod decreased psoas mobility )      Strength/MMT: (* denotes performed with pain)  Hip Knee   Flexion: R 4+/5; L 4+ 5  Extension: R 4/5; L 4/5  Abduction: R 4+/5; L 4+ /5  ER: R   4+*/5; L 4+/5 Flexion: R 4+/5; L 4/5  Extension: R 4/5; L 4-/5         Balance: SLS: R =5  sec, L 4 sec     Functional Mobility:  30 sec sit to stand: 9 reps                   Special Tests: - Spurling, - Sharp Emile  - SLR     Gait: pt ambulates on level ground with L knee held in flexion, decreased weight on L , trunk shifted L       Today’s Treatment and Response:    Pt education was provided on exam findings, treatment diagnosis, treatment plan, expectations, and prognosis.   Initiated levator and upper trap stretches, pectoral stretches, chin tucks  Initiated manual cervical traction and PROM  to C spine     Charges: PT Eval Low Complexity, ex ,M  Total Timed Treatment: 25min     Total Treatment Time: 45 min         PLAN OF CARE:    Goals: (To be met in 8 visits)   Pt will improve knee extension R ROM to 0 deg to allow proper heel strike during gait and terminal knee extension in stance  Improve LE strength as evidenced by improved 30 sec STS from 9 reps to 11   Achieve pain free cervical rotation L and lateral flexion L through stretching, posture correction and manual therapy   Pt will improve SLS to >  12 s to improve safety and independence with gait on uneven surfaces such as grass  Pt will be independent and compliant with comprehensive HEP to maintain progress achieved in PT     Frequency / Duration: Patient will be seen for 2* x/week or a total of 8 visits over a 90 day period.  Treatment will include: Gait training, Neuromuscular Re-education, Self-Care Home Management, Therapeutic Exercise and Home Exercise Program instruction     Education or treatment limitation: None  Rehab Potential:good        Patient/Family/Caregiver was advised of these findings, precautions, and treatment options and has agreed to actively participate in planning and for this course of care.     Thank you for your referral. Please co-sign or sign and return this letter via fax as soon as possible to [phone]. If you have any questions, please contact me at Dept: [phone]     Sincerely,  Electronically signed by therapist: Tracy Walker, PT  Physician's certification required: Yes  I certify the need for these services furnished under this plan of treatment and while under my care.     X___________________________________________________ Date____________________  Certification    3/21/24-6/20/24

## 2024-03-26 ENCOUNTER — OFFICE VISIT (OUTPATIENT)
Dept: PHYSICAL THERAPY | Age: 79
End: 2024-03-26
Attending: ORTHOPAEDIC SURGERY
Payer: MEDICARE

## 2024-03-26 PROCEDURE — 97112 NEUROMUSCULAR REEDUCATION: CPT

## 2024-03-26 PROCEDURE — 97110 THERAPEUTIC EXERCISES: CPT

## 2024-03-26 PROCEDURE — 97140 MANUAL THERAPY 1/> REGIONS: CPT

## 2024-03-26 NOTE — PROGRESS NOTES
Insurance Primary/Secondary: MEDICARE / [employer]     # Auth Visits:      8     Referring Physician: Dr. Myles  Diagnosis: L knee pain , gait disturbance  R neck pain  Date of Service: 3/21/2024    Precautions: NONE     From Initial Eval: Perla presents to physical therapy evaluation with primary c/o acute on chronic  R sided neck pain  as well as chronic bilateral knee pain that comes and goes .  . The results of the objective tests and measures show decreased R knee extension and decreased knee flexion at  swing through bilaterally leading to  altered gait pattern She is also limited in L hip extension and internal rotation. She has TTP at R levator scapulae and R sided neck pain when turning L or side bending left consistent with muscular strain and faulty posture.    Subjective:  Neck feels a little better.     Pain: 3 /10 R side of neck    No knee pain now, slight R knee pain in am.       Objective:   Cervical rotation L : reproduces R pain  ( min )   0-60 deg L   0-63 deg R    Cervical lateral flex L: reproduces R pain       Assessment:  Min restriction in C rot L ROM.  + tightness at R upper trap.  + TTP R T1-2 lateral facet.   Challenged by braiding exercise. Holds knees stiffly in gait.         Goals: (To be met in 8 visits)   Pt will improve knee extension R ROM to 0 deg to allow proper heel strike during gait and terminal knee extension in stance  Improve LE strength as evidenced by improved 30 sec STS from 9 reps to 11   Achieve pain free cervical rotation L and lateral flexion L through stretching, posture correction and manual therapy   Pt will improve SLS to >  12 s to improve safety and independence with gait on uneven surfaces such as grass  Pt will be independent and compliant with comprehensive HEP to maintain progress achieved in PT        Plan: Th 3/28, 4/2, 4/4   Cont with manual therapy and stretching to ease R neck pain, ROM  and strengthening for knees, gait and balance   Date:  3/26/2024  TX#: 2/5 Date:                 TX#: 3/ Date:                 TX#: 4/ Date:                 TX#: 5/ Date:   Tx#: 6/   Nustep warm up 5 mins       Supine alt shoulder flexion with 2# wts x15 R/L , supine protraction x15 R/L   - supine shoulder abduction x15         C retraction x10  - alt upper trap stretch in supine          Shoulder circles back x10        Side step R/L 10' x4 in Pbs         Manual : 15 mins  Cervical traction 10 sec hold x8  Knee:  manual OP into full extension with towel roll, then without  - tibial posterior mobs  - patellar mobs,   - PROM C rot R/L   - PROM lat flexion R/L       Neuroreed : 10 mins   Balance board A/P, x15   Each knees flexed, knees extended   - Side step R/L in Pbs   - braiding R/L 10' x4 , needing verbal cues and demonstration   - reinforced posture correction, relaxed shoulders              HEP: Initiated levator and upper trap stretches, pectoral stretches, chin tucks  Initiated manual cervical traction and PROM  to C spine    Charges: m,ex , neuroreed       Total Timed Treatment: 45 min  Total Treatment Time: 45 min

## 2024-03-28 ENCOUNTER — OFFICE VISIT (OUTPATIENT)
Dept: PHYSICAL THERAPY | Age: 79
End: 2024-03-28
Attending: ORTHOPAEDIC SURGERY
Payer: MEDICARE

## 2024-03-28 PROCEDURE — 97110 THERAPEUTIC EXERCISES: CPT

## 2024-03-28 PROCEDURE — 97140 MANUAL THERAPY 1/> REGIONS: CPT

## 2024-03-28 NOTE — PROGRESS NOTES
Insurance Primary/Secondary: MEDICARE / [employer]     # Auth Visits:      8     Referring Physician: Dr. Myles  Diagnosis: L knee pain , gait disturbance  R neck pain  Date of Service: 3/21/2024    Precautions: NONE     From Initial Eval: Perla presents to physical therapy evaluation with primary c/o acute on chronic  R sided neck pain  as well as chronic bilateral knee pain that comes and goes .  . The results of the objective tests and measures show decreased R knee extension and decreased knee flexion at  swing through bilaterally leading to  altered gait pattern She is also limited in L hip extension and internal rotation. She has TTP at R levator scapulae and R sided neck pain when turning L or side bending left consistent with muscular strain and faulty posture.    Subjective:  Neck feels  better., not as tight on R side     Pain: 3 /10 R side of neck    No knee pain now, slight R knee pain in am.       Objective:   Cervical rotation L : reproduces R pain  ( min )   0-60 deg L   0-63 deg R    Cervical lateral flex L: reproduces R pain       Assessment:  Min restriction in C rot L ROM.  Min tightness at R upper trap.   Min + TTP R T1-2 lateral facet.   Continued to need cues for braiding exercise. Holds knees stiffly in gait.  Challenged by tandem stance with C rotation        Goals: (To be met in 8 visits)   Pt will improve knee extension R ROM to 0 deg to allow proper heel strike during gait and terminal knee extension in stance  Improve LE strength as evidenced by improved 30 sec STS from 9 reps to 11   Achieve pain free cervical rotation L and lateral flexion L through stretching, posture correction and manual therapy   Pt will improve SLS to >  12 s to improve safety and independence with gait on uneven surfaces such as grass  Pt will be independent and compliant with comprehensive HEP to maintain progress achieved in PT        Plan: , 4/2, 4/4   Cont with manual therapy and stretching to ease R neck  pain, ROM  and strengthening for knees, gait and balance . Assess benefit of tape  Date: 3/26/2024  TX#: 2/5 Date:       3/28/2024           TX#: 3/5 Date:                 TX#: 4/ Date:                 TX#: 5/ Date:   Tx#: 6/   Nustep warm up 5 mins Nustep L6 , 6 min warm up      Supine alt shoulder flexion with 2# wts x15 R/L , supine protraction x15 R/L   - supine shoulder abduction x15  Shuttle 5c leg press  R/L SLP x10 ea  - alt calf stretches R/L x15 each        C retraction x10  - alt upper trap stretch in supine  Braiding R/L : in and out of Pbs    SLS R/L   Tandem stance R/L   - tandem stance with C rot R/L         Shoulder circles back x10  C AROM R/L , upper trap and levator stretches,   Upper cervical rotation and nod.       Side step R/L 10' x4 in Pbs  ---       Manual : 15 mins  Cervical traction 10 sec hold x8  Knee:  manual OP into full extension with towel roll, then without  - tibial posterior mobs  - patellar mobs,   - PROM C rot R/L   - PROM lat flexion R/L Manual : 15 mins  Cervical traction 10 sec hold x8  PROM C rot R/L   - PROM lat flexion R/L  STM to R levator , upper trap   Kinesiotape to inhibit levator on R       Neuroreed : 10 mins   Balance board A/P, x15   Each knees flexed, knees extended   - Side step R/L in Pbs   - braiding R/L 10' x4 , needing verbal cues and demonstration   - reinforced posture correction, relaxed shoulders              HEP: Initiated levator and upper trap stretches, pectoral stretches, chin tucks  3/28/2024   SLS R/L , braiding , tandem step with C rotation     Charges: m,ex2   Total Timed Treatment: 45 min  Total Treatment Time: 45 min

## 2024-04-02 ENCOUNTER — OFFICE VISIT (OUTPATIENT)
Dept: PHYSICAL THERAPY | Age: 79
End: 2024-04-02
Attending: ORTHOPAEDIC SURGERY
Payer: MEDICARE

## 2024-04-02 PROCEDURE — 97110 THERAPEUTIC EXERCISES: CPT

## 2024-04-02 PROCEDURE — 97112 NEUROMUSCULAR REEDUCATION: CPT

## 2024-04-02 PROCEDURE — 97140 MANUAL THERAPY 1/> REGIONS: CPT

## 2024-04-02 NOTE — PROGRESS NOTES
Insurance Primary/Secondary: MEDICARE / [employer]     # Auth Visits:      [id number]     Referring Physician: Dr. Myles  Diagnosis: L knee pain , gait disturbance  R neck pain  Date of Service: 3/21/2024    Precautions: NONE     From Initial Eval: Perla presents to physical therapy evaluation with primary c/o acute on chronic  R sided neck pain  as well as chronic bilateral knee pain that comes and goes .  . The results of the objective tests and measures show decreased R knee extension and decreased knee flexion at  swing through bilaterally leading to  altered gait pattern She is also limited in L hip extension and internal rotation. She has TTP at R levator scapulae and R sided neck pain when turning L or side bending left consistent with muscular strain and faulty posture.    Subjective:  Neck feels  better., not as tight on R side . Feels tape helped. Got new shoes and feels she is walking more stiff legged.     Pain: 0 /10 R side of neck , knees          Objective:     SLS L= 20 sec   R= 12 secs     Cervical rotation    0-60 deg L   0-63 deg R    Cervical lateral flex L: reproduces R pain/ pull      Assessment: Tends to avoid heel- toe sequence in gait and walk flat footed.   Min restriction in C rot L ROM Cont with KT as patient got relief of neck pain . .  Min tightness at R upper trap.   Min + TTP R T1-2 lateral facet.     R knee slightly weaker than L. Continued to need cues for braiding exercise. Holds knees stiffly in gait.  Challenged by tandem stance with C rotation        Goals: (To be met in 8 visits)   Pt will improve knee extension R ROM to 0 deg to allow proper heel strike during gait and terminal knee extension in stance    Improve LE strength as evidenced by improved 30 sec STS from 9 reps to 11  MET   Achieve pain free cervical rotation L and lateral flexion L through stretching, posture correction and manual therapy   IMPROVED   Pt will improve SLS to >  12 s to improve safety and independence with  gait on uneven surfaces such as grass MET   Pt will be independent and compliant with comprehensive HEP to maintain progress achieved in PT         Plan: , 4/4  1:15  Cont with manual therapy and stretching to ease R neck pain, ROM  and strengthening for knees, gait and balance  x1 visit . Recheck 30 sec sit to stand  Date: 3/26/2024  TX#: 2/5 Date:       3/28/2024           TX#: 3/5 Date:       4/2/2024           TX#: 4/5 Date:                 TX#: 5/ Date:   Tx#: 6/   Nustep warm up 5 mins Nustep L6 , 6 min warm up Walk 1/10 of mile  - practiced up and down 3 stairs w/out UE support     Supine alt shoulder flexion with 2# wts x15 R/L , supine protraction x15 R/L   - supine shoulder abduction x15  Shuttle 5c leg press  R/L SLP x10 ea  - alt calf stretches R/L x15 each  Shuttle 5c double leg press  x20   R/L SLP x15 ea  - alt calf stretches R/L x15 each   - Airex: march , SLS,   Toe raises on airex  - walk with high march   - ASU 6\" step x15 R/L      C retraction x10  - alt upper trap stretch in supine  Braiding R/L : in and out of Pbs    SLS R/L   Tandem stance R/L   - tandem stance with C rot R/L  Neuroreed: 15 mins  Braiding R/L : in and out of Pbs    SLS R/L   Tandem stance R/L   - tandem stance with C rot R/L x5 each        Shoulder circles back x10  C AROM R/L , upper trap and levator stretches,   Upper cervical rotation and nod.  Rocking horse : R/L   Practiced heel/toe gait , with shoes and w/out     Side step R/L 10' x4 in Pbs  ---       Manual : 15 mins  Cervical traction 10 sec hold x8  Knee:  manual OP into full extension with towel roll, then without  - tibial posterior mobs  - patellar mobs,   - PROM C rot R/L   - PROM lat flexion R/L Manual : 15 mins  Cervical traction 10 sec hold x8  PROM C rot R/L   - PROM lat flexion R/L  STM to R levator , upper trap   Kinesiotape to inhibit levator on R  Manual : 10 mins  Cervical traction 10 sec hold x8  PROM C rot R/L   - PROM lat flexion R/L  STM to R levator  , upper trap   Kinesiotape to inhibit levator and upper trap on R      Neuroreed : 10 mins   Balance board A/P, x15   Each knees flexed, knees extended   - Side step R/L in Pbs   - braiding R/L 10' x4 , needing verbal cues and demonstration   - reinforced posture correction, relaxed shoulders              HEP: Initiated levator and upper trap stretches, pectoral stretches, chin tucks  3/28/2024   SLS R/L , braiding , tandem step with C rotation     Charges: m,ex, neuroreed    Total Timed Treatment: 45 min  Total Treatment Time: 45 min

## 2024-04-04 ENCOUNTER — OFFICE VISIT (OUTPATIENT)
Dept: PHYSICAL THERAPY | Age: 79
End: 2024-04-04
Attending: ORTHOPAEDIC SURGERY
Payer: MEDICARE

## 2024-04-04 PROCEDURE — 97140 MANUAL THERAPY 1/> REGIONS: CPT

## 2024-04-04 PROCEDURE — 97110 THERAPEUTIC EXERCISES: CPT

## 2024-04-04 PROCEDURE — 97116 GAIT TRAINING THERAPY: CPT

## 2024-04-04 NOTE — PROGRESS NOTES
Insurance Primary/Secondary: MEDICARE / [employer]     # Auth Visits:      8     Referring Physician: Dr. Myles  Diagnosis: L knee pain , gait disturbance  R neck pain  Date of Service: 3/21/2024    Precautions: NONE     From Initial Eval: Perla presents to physical therapy evaluation with primary c/o acute on chronic  R sided neck pain  as well as chronic bilateral knee pain that comes and goes .  . The results of the objective tests and measures show decreased R knee extension and decreased knee flexion at  swing through bilaterally leading to  altered gait pattern She is also limited in L hip extension and internal rotation. She has TTP at R levator scapulae and R sided neck pain when turning L or side bending left consistent with muscular strain and faulty posture.      Discharge Summary     Patient has completed 5  of 5 visits with no cancels, zero no shows.     Subjective:  Notes she woke with a lot of soreness on R part of her neck . Knows that her pain is worse when she is worried about things .  Feels tape helped.      Pain: 3 /10 R side of neck , knees          Objective:     30 sec sit to stand =9 reps   SLS L= 20 sec   R= 12 secs     Cervical rotation    0-60 deg L   0-63 deg R    Cervical lateral flex L: reproduces R pain/ pull      Assessment:  Patient is discharged from PT today as she in IND with her home program and will continue to exercise with her . Tends to avoid heel- toe sequence in gait and walk flat footed which seems to be more for security and out of habit that from physical impairment. .   Min restriction in C rot L ROM Cont with KT as patient got relief of neck pain . .  Min tightness at R upper trap.   Min + TTP R T1-2 lateral facet.     R knee slightly weaker than L. Continued to need cues for braiding exercise. .  Challenged by tandem stance with C rotation        Goals: (To be met in 8 visits)   Pt will improve knee extension R ROM to 0 deg to allow proper heel strike  during gait and terminal knee extension in stance  IMPROVED   Improve LE strength as evidenced by improved 30 sec STS from 9 reps to 11  not met  Achieve pain free cervical rotation L and lateral flexion L through stretching, posture correction and manual therapy   IMPROVED   Pt will improve SLS to >  12 s to improve safety and independence with gait on uneven surfaces such as grass MET   Pt will be independent and compliant with comprehensive HEP to maintain progress achieved in PT         Plan: Discharge PT with HEP .   Date: 3/26/2024  TX#: 2/5 Date:       3/28/2024           TX#: 3/5 Date:       4/2/2024           TX#: 4/5 Date:       4/4/2024           TX#: 5/5 Date:   Tx#: 6/   Nustep warm up 5 mins Nustep L6 , 6 min warm up Walk 1/10 of mile  - practiced up and down 3 stairs w/out UE support  Exercise:   15 mins total  Nustep   war/m up ,   Ues at 8 , L2 5 mins      Supine alt shoulder flexion with 2# wts x15 R/L , supine protraction x15 R/L   - supine shoulder abduction x15  Shuttle 5c leg press  R/L SLP x10 ea  - alt calf stretches R/L x15 each  Shuttle 5c double leg press  x20   R/L SLP x15 ea  - alt calf stretches R/L x15 each   - Airex: march , SLS,   Toe raises on airex  - walk with high march   - ASU 6\" step x15 R/L Sit to stand x9     C retraction x10  - alt upper trap stretch in supine  Braiding R/L : in and out of Pbs    SLS R/L   Tandem stance R/L   - tandem stance with C rot R/L  Neuroreed: 15 mins  Braiding R/L : in and out of Pbs    SLS R/L   Tandem stance R/L   - tandem stance with C rot R/L x5 each  Tandem stance with C rotation: R/L  x10   -R foot forward, then L foot forward       Shoulder circles back x10  C AROM R/L , upper trap and levator stretches,   Upper cervical rotation and nod.  Rocking horse : R/L   Practiced heel/toe gait , with shoes and w/out Gait: 15 mins   Pre-gait   Rocking horse : R/L   Practiced heel/toe gait , with shoes   - Amb 1/10 of mile with slow oly, cued for  environmental barriers and heel toe pattern     Side step R/L 10' x4 in Pbs  ---  Side step R/L 10' x4 in Pbs   - high knees march R/L  10' x4  - walk with KE x10     Manual : 15 mins  Cervical traction 10 sec hold x8  Knee:  manual OP into full extension with towel roll, then without  - tibial posterior mobs  - patellar mobs,   - PROM C rot R/L   - PROM lat flexion R/L Manual : 15 mins  Cervical traction 10 sec hold x8  PROM C rot R/L   - PROM lat flexion R/L  STM to R levator , upper trap   Kinesiotape to inhibit levator on R  Manual : 10 mins  Cervical traction 10 sec hold x8  PROM C rot R/L   - PROM lat flexion R/L  STM to R levator , upper trap   Kinesiotape to inhibit levator and upper trap on R  Manual : 15 mins  Cervical traction 10 sec hold x10  PROM C rot R/L   - PROM lat flexion R/L  STM to R levator , upper trap   - side lying R scapular mobs   - suboccipital release    Neuroreed : 10 mins   Balance board A/P, x15   Each knees flexed, knees extended   - Side step R/L in Pbs   - braiding R/L 10' x4 , needing verbal cues and demonstration   - reinforced posture correction, relaxed shoulders              HEP: Initiated levator and upper trap stretches, pectoral stretches, chin tucks  3/28/2024   SLS R/L , braiding , tandem step with C rotation     Charges: m,ex, gt Total Timed Treatment: 45 min  Total Treatment Time: 45 min

## (undated) DIAGNOSIS — M54.2 NECK PAIN: Primary | ICD-10-CM